# Patient Record
Sex: FEMALE | Race: OTHER | HISPANIC OR LATINO | Employment: UNEMPLOYED | URBAN - METROPOLITAN AREA
[De-identification: names, ages, dates, MRNs, and addresses within clinical notes are randomized per-mention and may not be internally consistent; named-entity substitution may affect disease eponyms.]

---

## 2024-10-21 ENCOUNTER — HOSPITAL ENCOUNTER (EMERGENCY)
Facility: HOSPITAL | Age: 49
Discharge: HOME | End: 2024-10-21
Payer: COMMERCIAL

## 2024-10-21 VITALS
WEIGHT: 130 LBS | TEMPERATURE: 97.9 F | BODY MASS INDEX: 20.89 KG/M2 | OXYGEN SATURATION: 97 % | DIASTOLIC BLOOD PRESSURE: 64 MMHG | HEART RATE: 70 BPM | SYSTOLIC BLOOD PRESSURE: 106 MMHG | HEIGHT: 66 IN | RESPIRATION RATE: 16 BRPM

## 2024-10-21 DIAGNOSIS — M54.50 ACUTE EXACERBATION OF CHRONIC LOW BACK PAIN: Primary | ICD-10-CM

## 2024-10-21 DIAGNOSIS — G89.29 ACUTE EXACERBATION OF CHRONIC LOW BACK PAIN: Primary | ICD-10-CM

## 2024-10-21 PROCEDURE — 99283 EMERGENCY DEPT VISIT LOW MDM: CPT

## 2024-10-21 PROCEDURE — 2500000004 HC RX 250 GENERAL PHARMACY W/ HCPCS (ALT 636 FOR OP/ED): Performed by: PHYSICIAN ASSISTANT

## 2024-10-21 PROCEDURE — 96372 THER/PROPH/DIAG INJ SC/IM: CPT | Performed by: PHYSICIAN ASSISTANT

## 2024-10-21 PROCEDURE — 2500000001 HC RX 250 WO HCPCS SELF ADMINISTERED DRUGS (ALT 637 FOR MEDICARE OP): Performed by: PHYSICIAN ASSISTANT

## 2024-10-21 RX ORDER — PREDNISONE 20 MG/1
20 TABLET ORAL 3 TIMES DAILY
Qty: 15 TABLET | Refills: 0 | Status: SHIPPED | OUTPATIENT
Start: 2024-10-21 | End: 2024-10-26

## 2024-10-21 RX ORDER — CYCLOBENZAPRINE HCL 10 MG
10 TABLET ORAL ONCE
Status: COMPLETED | OUTPATIENT
Start: 2024-10-21 | End: 2024-10-21

## 2024-10-21 RX ORDER — CYCLOBENZAPRINE HCL 10 MG
10 TABLET ORAL 2 TIMES DAILY PRN
Qty: 10 TABLET | Refills: 0 | Status: SHIPPED | OUTPATIENT
Start: 2024-10-21 | End: 2024-10-26

## 2024-10-21 RX ORDER — GABAPENTIN 300 MG/1
300 CAPSULE ORAL ONCE
Status: COMPLETED | OUTPATIENT
Start: 2024-10-21 | End: 2024-10-21

## 2024-10-21 RX ORDER — OXYCODONE AND ACETAMINOPHEN 5; 325 MG/1; MG/1
1 TABLET ORAL EVERY 6 HOURS PRN
Qty: 12 TABLET | Refills: 0 | Status: SHIPPED | OUTPATIENT
Start: 2024-10-21 | End: 2024-10-24

## 2024-10-21 RX ORDER — GABAPENTIN 300 MG/1
300 CAPSULE ORAL 3 TIMES DAILY
Qty: 42 CAPSULE | Refills: 0 | Status: SHIPPED | OUTPATIENT
Start: 2024-10-21 | End: 2024-11-04

## 2024-10-21 RX ORDER — KETOROLAC TROMETHAMINE 30 MG/ML
15 INJECTION, SOLUTION INTRAMUSCULAR; INTRAVENOUS ONCE
Status: COMPLETED | OUTPATIENT
Start: 2024-10-21 | End: 2024-10-21

## 2024-10-21 RX ORDER — LIDOCAINE 50 MG/G
1 PATCH TOPICAL DAILY
Qty: 10 PATCH | Refills: 0 | Status: SHIPPED | OUTPATIENT
Start: 2024-10-21 | End: 2024-10-31

## 2024-10-21 RX ORDER — OXYCODONE AND ACETAMINOPHEN 5; 325 MG/1; MG/1
1 TABLET ORAL ONCE
Status: COMPLETED | OUTPATIENT
Start: 2024-10-21 | End: 2024-10-21

## 2024-10-21 ASSESSMENT — LIFESTYLE VARIABLES
HAVE PEOPLE ANNOYED YOU BY CRITICIZING YOUR DRINKING: NO
EVER HAD A DRINK FIRST THING IN THE MORNING TO STEADY YOUR NERVES TO GET RID OF A HANGOVER: NO
HAVE YOU EVER FELT YOU SHOULD CUT DOWN ON YOUR DRINKING: NO
TOTAL SCORE: 0
EVER FELT BAD OR GUILTY ABOUT YOUR DRINKING: NO

## 2024-10-21 ASSESSMENT — PAIN SCALES - GENERAL: PAINLEVEL_OUTOF10: 9

## 2024-10-21 ASSESSMENT — COLUMBIA-SUICIDE SEVERITY RATING SCALE - C-SSRS
6. HAVE YOU EVER DONE ANYTHING, STARTED TO DO ANYTHING, OR PREPARED TO DO ANYTHING TO END YOUR LIFE?: NO
2. HAVE YOU ACTUALLY HAD ANY THOUGHTS OF KILLING YOURSELF?: NO
1. IN THE PAST MONTH, HAVE YOU WISHED YOU WERE DEAD OR WISHED YOU COULD GO TO SLEEP AND NOT WAKE UP?: NO

## 2024-10-21 ASSESSMENT — PAIN - FUNCTIONAL ASSESSMENT: PAIN_FUNCTIONAL_ASSESSMENT: 0-10

## 2024-10-21 NOTE — ED PROVIDER NOTES
"HPI   Chief Complaint   Patient presents with    Back Pain     \"Here for back pain the goes into left hip side.  Has been going on for a cuple of weeks but the past 3 days has intensified.\"       48-year-old female presents to the emergency department for complaints of acute on chronic low back pain.  Patient's daughter gives a history, stating that the patient is from [place] and typically takes gabapentin and Percocet but has been in a while longer than anticipated and is not sure when she is going back.  She has been out of her medications for 2 weeks and is having a lot of pain.  She is having pain when she bends or lifts. Patient denies incontinence, sensory motor changes, urinary retention, saddle anesthesia, gait disturbance, radiation into the legs/abdomen/groin.  Patient is trying to have her prescriptions switched to [place] but was told it would take 30 days.                Patient History   Past Medical History:   Diagnosis Date    Back pain      History reviewed. No pertinent surgical history.  No family history on file.  Social History     Tobacco Use    Smoking status: Never    Smokeless tobacco: Never   Vaping Use    Vaping status: Never Used   Substance Use Topics    Alcohol use: Never    Drug use: Never       Physical Exam   ED Triage Vitals [10/21/24 1811]   Temperature Heart Rate Respirations BP   36.6 °C (97.9 °F) 70 16 106/64      Pulse Ox Temp Source Heart Rate Source Patient Position   97 % Temporal Monitor Sitting      BP Location FiO2 (%)     Right arm --       Physical Exam  Vitals and nursing note reviewed.   Constitutional:       General: She is not in acute distress.  HENT:      Head: Atraumatic.      Mouth/Throat:      Mouth: Mucous membranes are moist.      Pharynx: Oropharynx is clear.   Eyes:      Extraocular Movements: Extraocular movements intact.      Conjunctiva/sclera: Conjunctivae normal.      Pupils: Pupils are equal, round, and reactive to light.   Cardiovascular:      Rate and " Rhythm: Normal rate and regular rhythm.      Pulses: Normal pulses.   Pulmonary:      Effort: Pulmonary effort is normal. No respiratory distress.      Breath sounds: Normal breath sounds.   Abdominal:      General: There is no distension.      Palpations: Abdomen is soft.      Tenderness: There is no abdominal tenderness. There is no guarding or rebound.   Musculoskeletal:         General: No deformity.      Cervical back: Neck supple.      Comments: Bilateral lower paraspinous muscles of L-spine are tender to palpation.   Normal gait. Can perform heel-toe raises. No foot drop. Equal range of motion, sensation, strength and pulses in the lower extremities bilaterally.      Skin:     General: Skin is warm and dry.   Neurological:      Mental Status: She is alert and oriented to person, place, and time. Mental status is at baseline.      Cranial Nerves: No cranial nerve deficit.      Sensory: No sensory deficit.      Motor: No weakness.   Psychiatric:         Mood and Affect: Mood normal.         Behavior: Behavior normal.           ED Course & MDM   Diagnoses as of 10/21/24 1915   Acute exacerbation of chronic low back pain                 No data recorded                                 Medical Decision Making  48-year-old female presents to the emergency department for acute on chronic back pain.  She does not have any history of physical signs concerning for cauda equina or spinal cord impingement syndrome.  On my exam, she is tender in the paraspinous muscles of the low lumbar spine but is neurovascularly intact in her extremities and has a normal gait.  Discussed with the patient and daughter that I can write 3 days of Percocet but no more.  I can write 2 weeks of gabapentin but no more and they do not know her dose so I instructed them that I will have to give her the lowest dose.  I treated the patient here with oral Percocet, gabapentin, Flexeril and IM Toradol.  I prescribed her Percocet, gabapentin,  prednisone and Flexeril.  Discussed results with patient and/or family/friend and recommended close follow up with primary care or specialist.  Reviewed return precautions at length.  I answered all questions.           Procedure  Procedures     Amirah Stubbs PA-C  10/21/24 1917

## 2024-12-05 ENCOUNTER — HOSPITAL ENCOUNTER (EMERGENCY)
Facility: HOSPITAL | Age: 49
Discharge: HOME | End: 2024-12-05
Payer: COMMERCIAL

## 2024-12-05 VITALS
DIASTOLIC BLOOD PRESSURE: 98 MMHG | HEIGHT: 66 IN | RESPIRATION RATE: 20 BRPM | OXYGEN SATURATION: 100 % | TEMPERATURE: 97.9 F | WEIGHT: 130 LBS | BODY MASS INDEX: 20.89 KG/M2 | SYSTOLIC BLOOD PRESSURE: 157 MMHG | HEART RATE: 82 BPM

## 2024-12-05 DIAGNOSIS — M54.89 OTHER CHRONIC BACK PAIN: ICD-10-CM

## 2024-12-05 DIAGNOSIS — M54.41 CHRONIC BILATERAL LOW BACK PAIN WITH RIGHT-SIDED SCIATICA: Primary | ICD-10-CM

## 2024-12-05 DIAGNOSIS — G89.29 CHRONIC BILATERAL LOW BACK PAIN WITH RIGHT-SIDED SCIATICA: Primary | ICD-10-CM

## 2024-12-05 DIAGNOSIS — G89.29 OTHER CHRONIC BACK PAIN: ICD-10-CM

## 2024-12-05 PROCEDURE — 2500000004 HC RX 250 GENERAL PHARMACY W/ HCPCS (ALT 636 FOR OP/ED)

## 2024-12-05 PROCEDURE — 99284 EMERGENCY DEPT VISIT MOD MDM: CPT

## 2024-12-05 PROCEDURE — 96372 THER/PROPH/DIAG INJ SC/IM: CPT

## 2024-12-05 RX ORDER — MORPHINE SULFATE 4 MG/ML
4 INJECTION, SOLUTION INTRAMUSCULAR; INTRAVENOUS ONCE
Status: COMPLETED | OUTPATIENT
Start: 2024-12-05 | End: 2024-12-05

## 2024-12-05 RX ORDER — METHYLPREDNISOLONE 4 MG/1
TABLET ORAL
Qty: 21 TABLET | Refills: 0 | Status: SHIPPED | OUTPATIENT
Start: 2024-12-05 | End: 2024-12-11

## 2024-12-05 RX ORDER — GABAPENTIN 100 MG/1
300 CAPSULE ORAL ONCE AS NEEDED
Qty: 5 CAPSULE | Refills: 0 | Status: SHIPPED | OUTPATIENT
Start: 2024-12-05 | End: 2024-12-10

## 2024-12-05 RX ADMIN — MORPHINE SULFATE 4 MG: 4 INJECTION, SOLUTION INTRAMUSCULAR; INTRAVENOUS at 18:11

## 2024-12-05 ASSESSMENT — COLUMBIA-SUICIDE SEVERITY RATING SCALE - C-SSRS
2. HAVE YOU ACTUALLY HAD ANY THOUGHTS OF KILLING YOURSELF?: NO
6. HAVE YOU EVER DONE ANYTHING, STARTED TO DO ANYTHING, OR PREPARED TO DO ANYTHING TO END YOUR LIFE?: NO
1. IN THE PAST MONTH, HAVE YOU WISHED YOU WERE DEAD OR WISHED YOU COULD GO TO SLEEP AND NOT WAKE UP?: NO

## 2024-12-05 ASSESSMENT — LIFESTYLE VARIABLES
HAVE PEOPLE ANNOYED YOU BY CRITICIZING YOUR DRINKING: NO
TOTAL SCORE: 0
HAVE YOU EVER FELT YOU SHOULD CUT DOWN ON YOUR DRINKING: NO
EVER FELT BAD OR GUILTY ABOUT YOUR DRINKING: NO
EVER HAD A DRINK FIRST THING IN THE MORNING TO STEADY YOUR NERVES TO GET RID OF A HANGOVER: NO

## 2024-12-05 ASSESSMENT — PAIN DESCRIPTION - PAIN TYPE: TYPE: ACUTE PAIN

## 2024-12-05 ASSESSMENT — PAIN - FUNCTIONAL ASSESSMENT: PAIN_FUNCTIONAL_ASSESSMENT: 0-10

## 2024-12-05 ASSESSMENT — PAIN SCALES - GENERAL: PAINLEVEL_OUTOF10: 9

## 2024-12-05 ASSESSMENT — PAIN DESCRIPTION - LOCATION: LOCATION: BACK

## 2024-12-05 ASSESSMENT — PAIN DESCRIPTION - PROGRESSION: CLINICAL_PROGRESSION: NOT CHANGED

## 2024-12-05 ASSESSMENT — PAIN DESCRIPTION - DESCRIPTORS: DESCRIPTORS: ACHING

## 2024-12-05 ASSESSMENT — PAIN DESCRIPTION - FREQUENCY: FREQUENCY: CONSTANT/CONTINUOUS

## 2024-12-05 ASSESSMENT — PAIN DESCRIPTION - ONSET: ONSET: SUDDEN

## 2024-12-05 NOTE — ED PROVIDER NOTES
HPI   Chief Complaint   Patient presents with    Back Pain     Upper back pain x 2 weeks       History provided by:  Patient and relative   used: No      49-year-old female with a past medical history significant for foraminal stenosis of the lumbar spine, arthritis, anxiety and depression, sacroiliitis, HIV and fibromyalgia presents to the emergency departments with a chief complaint of back pain.  Patient states the pain is across her lower back as well as upper back and cervical area.  Patient describes the pain as 8 out of 10, sharp, intermittent, radiating down her left leg.  She states it is worse with movement and to the touch.  Nothing is made it better.  Last pain medication was Tylenol, taken 5 hours before coming to the emergency department with no improvement.  Patient states the pain can often lead to a headache.  But she has been dealing with this pain her whole life.  She endorses occasional tingling in her hands.  She came in today because she states it is more sensitive to the touch.  She reports recently having moved from [place] approximately 3 months ago and has not been able to secure insurance.  Due to this she has not been able to see pain management in [place].  She states she normally takes gabapentin and oxycodone for her pain but has not taken either of them since the first week of November.  Patient denies IV drug use, incontinence, sensorimotor changes, gait disturbance, saddle anesthesia, chest pain, shortness of breath, sick contact, fever, chills, and body aches.      Patient History   Past Medical History:   Diagnosis Date    Back pain      History reviewed. No pertinent surgical history.  No family history on file.  Social History     Tobacco Use    Smoking status: Never    Smokeless tobacco: Never   Vaping Use    Vaping status: Never Used   Substance Use Topics    Alcohol use: Never    Drug use: Never       Physical Exam   ED Triage Vitals [12/05/24 1707]    Temperature Heart Rate Respirations BP   36.6 °C (97.9 °F) (!) 104 20 (!) 157/98      Pulse Ox Temp Source Heart Rate Source Patient Position   98 % Temporal Monitor Sitting      BP Location FiO2 (%)     Right arm --       Physical Exam  Vitals and nursing note reviewed.   Constitutional:       General: She is not in acute distress.     Appearance: Normal appearance. She is normal weight. She is not ill-appearing, toxic-appearing or diaphoretic.   Cardiovascular:      Rate and Rhythm: Regular rhythm. Tachycardia present.      Pulses: Normal pulses.      Heart sounds: Normal heart sounds. No murmur heard.     No friction rub. No gallop.   Pulmonary:      Effort: Pulmonary effort is normal. No respiratory distress.      Breath sounds: Normal breath sounds.   Chest:      Chest wall: No tenderness.   Abdominal:      General: Bowel sounds are normal.      Tenderness: There is no abdominal tenderness.   Musculoskeletal:         General: Normal range of motion.      Cervical back: Neck supple. Tenderness present. No deformity, erythema or bony tenderness. Pain with movement present. Decreased range of motion: Baseline, due to pain.      Thoracic back: Normal.      Lumbar back: Tenderness present. No bony tenderness. Decreased range of motion: Baseline due to pain. Negative right straight leg raise test.      Right lower leg: No edema.      Left lower leg: No edema.   Skin:     General: Skin is warm.      Capillary Refill: Capillary refill takes less than 2 seconds.      Findings: No bruising, erythema or rash.   Neurological:      General: No focal deficit present.      Mental Status: She is alert and oriented to person, place, and time.       ED Course & MDM   Diagnoses as of 12/05/24 1827   Chronic bilateral low back pain with right-sided sciatica   Other chronic back pain     No data recorded     Arielle Coma Scale Score: 15 (12/05/24 1706 : Bao Badillo RN)                   Medical Decision Making  49-year-old  female with a past medical history significant for foraminal stenosis of the lumbar spine, arthritis, anxiety and depression, sacroiliitis, HIV and fibromyalgia presents to the emergency departments with a chief complaint of back pain.  Initially tachycardic at 104 upon repeat evaluation, 84 prior to discharge.    Upon initial evaluation patient is in no acute distress, not ill-appearing and sitting in mild discomfort in her chair.  On physical exam patient is able to ambulate with normal gait.  Heart sounds normal.  Lungs clear to auscultation bilaterally.  Patient is tender to palpation bilateral paraspinal lumbar region.  Neurovascularly intact.  Tenderness to palpation cervical region as well as over both trapezius muscles.  Given history and physical exam I have low concern for cauda equina or spinal cord impingement syndrome.  I treated the patient in the emergency department with IM morphine.  Patient is doing well with improved pain prior to discharge.  Patient provided documentation that showed her prescription for gabapentin, which was prescribed as 400 mg once daily prior to moving to [place].    Differentials were discussed with the patient and given chronic nature of patient's symptoms and improvement with pain medication she was discharged with a prescription for gabapentin 300 mg once a day for 5 days.  Medrol Dosepak was also prescribed.  While in the emergency department patient scheduled an appointment with pain management.  I recommended close follow-up with primary care provider as well as a pain management referral.  Return precautions reviewed at length.  Patient demonstrated understanding, all questions were answered, and patient was in agreement with the plan.       Samy Saucedo PA-C  12/05/24 1924

## 2024-12-06 ENCOUNTER — TELEPHONE (OUTPATIENT)
Dept: PAIN MEDICINE | Facility: CLINIC | Age: 49
End: 2024-12-06
Payer: COMMERCIAL

## 2024-12-19 ENCOUNTER — APPOINTMENT (OUTPATIENT)
Dept: PAIN MEDICINE | Facility: CLINIC | Age: 49
End: 2024-12-19
Payer: COMMERCIAL

## 2024-12-20 ENCOUNTER — APPOINTMENT (OUTPATIENT)
Dept: PAIN MEDICINE | Facility: CLINIC | Age: 49
End: 2024-12-20
Payer: COMMERCIAL

## 2025-02-12 ENCOUNTER — TELEPHONE (OUTPATIENT)
Dept: NEUROSURGERY | Facility: CLINIC | Age: 50
End: 2025-02-12
Payer: COMMERCIAL

## 2025-02-21 ENCOUNTER — APPOINTMENT (OUTPATIENT)
Facility: CLINIC | Age: 50
End: 2025-02-21
Payer: COMMERCIAL

## 2025-03-10 ENCOUNTER — APPOINTMENT (OUTPATIENT)
Dept: NEUROSURGERY | Facility: CLINIC | Age: 50
End: 2025-03-10
Payer: COMMERCIAL

## 2025-03-17 ENCOUNTER — APPOINTMENT (OUTPATIENT)
Dept: NEUROSURGERY | Facility: CLINIC | Age: 50
End: 2025-03-17
Payer: COMMERCIAL

## 2025-03-17 DIAGNOSIS — G89.29 CHRONIC BILATERAL THORACIC BACK PAIN: ICD-10-CM

## 2025-03-17 DIAGNOSIS — G89.29 CHRONIC BILATERAL LOW BACK PAIN WITH LEFT-SIDED SCIATICA: ICD-10-CM

## 2025-03-17 DIAGNOSIS — M54.2 NECK PAIN: Primary | ICD-10-CM

## 2025-03-17 DIAGNOSIS — M54.6 CHRONIC BILATERAL THORACIC BACK PAIN: ICD-10-CM

## 2025-03-17 DIAGNOSIS — M54.42 CHRONIC BILATERAL LOW BACK PAIN WITH LEFT-SIDED SCIATICA: ICD-10-CM

## 2025-03-17 PROCEDURE — 99205 OFFICE O/P NEW HI 60 MIN: CPT | Performed by: PHYSICIAN ASSISTANT

## 2025-03-17 RX ORDER — DICLOFENAC SODIUM 75 MG/1
75 TABLET, DELAYED RELEASE ORAL 2 TIMES DAILY
Qty: 28 TABLET | Refills: 0 | Status: SHIPPED | OUTPATIENT
Start: 2025-03-17 | End: 2025-03-31

## 2025-03-17 NOTE — PROGRESS NOTES
[facility]  Department of Neurological Surgery  New Patient Visit    History of Present Illness:  Linh Patterson is a 49 y.o. year old female who presents to the spine clinic with axial neck pain, bilateral mid thoracic pain, and bilateral low back pain with sciatica down her left leg.  She states this been ongoing since 2005 though she has recently moved to [place] and has yet to establish with a provider here.  Previously had worked through injections into her lower back which initially provided some relief though and subsequent trials with reduced effectiveness.  Over the past few months she has noticed that worsening sciatic pain leading her to limp more than her prior left leg surgeries caused.  Lifting, standing, walking up steps is when she feels her symptoms most severe while resting there reduced somewhat.  She denies any right leg pain at this time or pain wrapping laterally through her rib cage to the front of her chest.  Neck pain does not travel into her arms and she denies any numbness or weakness in upper extremities.  She does continue on Tylenol significantly as well as gabapentin recently increased to 400 mg though minimal improvement if any.     Prior Spine Surgeries: none    Physical Therapy: prior  Diabetic:   no     Patient's BMI is There is no height or weight on file to calculate BMI.    14/14 systems reviewed and negative other than what is listed in the history of present illness    There is no problem list on file for this patient.    Past Medical History:   Diagnosis Date    Back pain      No past surgical history on file.  Social History     Tobacco Use    Smoking status: Never    Smokeless tobacco: Never   Substance Use Topics    Alcohol use: Never     family history is not on file.    Current Outpatient Medications:     cyclobenzaprine (Flexeril) 10 mg tablet, Take 1 tablet (10 mg) by mouth 2 times a day as needed for muscle spasms for up to 5 days., Disp:  10 tablet, Rfl: 0    diclofenac (Voltaren) 75 mg EC tablet, Take 1 tablet (75 mg) by mouth 2 times a day for 14 days. Do not crush, chew, or split., Disp: 28 tablet, Rfl: 0    gabapentin (Neurontin) 100 mg capsule, Take 3 capsules (300 mg) by mouth 1 time if needed (Back pain) for up to 5 days., Disp: 5 capsule, Rfl: 0    gabapentin (Neurontin) 300 mg capsule, Take 1 capsule (300 mg) by mouth 3 times a day for 14 days., Disp: 42 capsule, Rfl: 0  Allergies   Allergen Reactions    Iodinated Contrast Media Swelling    Ultram [Tramadol] Hives       Physical Examination    General: Well developed, awake/alert/oriented x3, no distress, alert and cooperative  Skin: Warm and dry, no lesions, no rashes  ENMT: Mucous membranes moist, no apparent injury, no lesions seen  Head/Neck: Neck Supple, no apparent injury  Respiratory/Thorax: Normal breath sounds with good chest expansion, thorax symmetric  Cardiovascular: No pitting edema, no JVD    Motor Strength: 4/5 left hip extension, left knee flexion, left ankle dorsiflexion, otherwise 5/5 Throughout all extremities    Muscle Bulk: Normal and symmetric in all extremities    Posture:   -- Cervical: Normal  -- Thoracic: Normal  -- Lumbar : Normal  Paraspinal muscle spasm/tenderness present bilateral lower lumbar   midline tenderness present lower cervical, mid thoracic proximately T7, and lower lumbar      Sensation: Moderate deficit to sensation left laterally through thigh continuing to calf and dorsum of foot intact to light touch       Assessment and Plan:  Linh Patterson is a 49 y.o. year old female who presents to the spine clinic with axial neck pain, bilateral mid thoracic pain, and bilateral low back pain with sciatica down her left leg.  She states this been ongoing since 2005 though she has recently moved to [place] and has yet to establish with a provider here.  Previously had worked through injections into her lower back which initially provided some relief though  and subsequent trials with reduced effectiveness.  Over the past few months she has noticed that worsening sciatic pain leading her to limp more than her prior left leg surgeries caused.  Lifting, standing, walking up steps is when she feels her symptoms most severe while resting there reduced somewhat.  She denies any right leg pain at this time or pain wrapping laterally through her rib cage to the front of her chest.  Neck pain does not travel into her arms and she denies any numbness or weakness in upper extremities.  She does continue on Tylenol significantly as well as gabapentin recently increased to 400 mg though minimal improvement if any.     Would appreciate x-rays including flexion-extension for her axial spine to evaluate alignment and dynamic stability.  Will also need MRI lumbar spine to investigate causes for unilateral numbness and weakness on exam.  Prescription for diclofenac anti-inflammatory sent to pharmacy which may be utilized with Tylenol and gabapentin.  Will provide referral to pain management additionally for local treatment options.       I have reviewed all prior documentation and reviewed the electronic medical record since admission. I have personally have reviewed all advanced imaging not just the reports and used my interpretation as documented as the relevant findings. I have reviewed the risks and benefits of all treatment recommendations listed in this note with the patient and family.       The above clinical summary has been dictated with voice recognition software. It has not been proofread for grammatical errors, typographical mistakes, or other semantic inconsistencies.    Thank you for visiting our office today. It was our pleasure to take part in your healthcare.     Do not hesitate to call with any questions regarding your plan of care after leaving at [phone] M-F 8am-4pm.     To clinicians, thank you very much for this kind referral. It is a privilege to partner  with you in the care of your patients. My office would be delighted to assist you with any further consultations or with questions regarding the plan of care outlined. Do not hesitate to call the office or contact me directly.       Sincerely,      REYNOLD Umanzor, PA-C  Associate Physician Assistant, Neurosurgery  Clinical   [facility] [address]    Phone: [phone]  Fax: [phone]

## 2025-05-30 ENCOUNTER — HOSPITAL ENCOUNTER (OUTPATIENT)
Dept: RADIOLOGY | Facility: HOSPITAL | Age: 50
Discharge: HOME | End: 2025-05-30
Payer: MEDICARE

## 2025-05-30 VITALS — HEIGHT: 66 IN | WEIGHT: 130 LBS | BODY MASS INDEX: 20.89 KG/M2

## 2025-05-30 DIAGNOSIS — Z12.31 ENCOUNTER FOR SCREENING MAMMOGRAM FOR MALIGNANT NEOPLASM OF BREAST: ICD-10-CM

## 2025-05-30 PROCEDURE — 77067 SCR MAMMO BI INCL CAD: CPT

## 2025-06-10 ENCOUNTER — HOSPITAL ENCOUNTER (OUTPATIENT)
Dept: RADIOLOGY | Facility: EXTERNAL LOCATION | Age: 50
Discharge: HOME | End: 2025-06-10

## 2025-06-10 DIAGNOSIS — Z12.31 ENCOUNTER FOR SCREENING MAMMOGRAM FOR MALIGNANT NEOPLASM OF BREAST: ICD-10-CM
